# Patient Record
Sex: MALE | Race: ASIAN | Employment: STUDENT | URBAN - METROPOLITAN AREA
[De-identification: names, ages, dates, MRNs, and addresses within clinical notes are randomized per-mention and may not be internally consistent; named-entity substitution may affect disease eponyms.]

---
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
## 2020-10-15 ENCOUNTER — TELEPHONE (OUTPATIENT)
Dept: SCHEDULING | Age: 20
End: 2020-10-15

## 2020-11-06 ENCOUNTER — APPOINTMENT (OUTPATIENT)
Dept: FAMILY MEDICINE | Age: 20
End: 2020-11-06

## 2022-11-18 NOTE — ED PROVIDER NOTES
Patient Seen in: [facility] Emergency Department      History   Patient presents with:  Eval-P (psychiatric)    Stated Complaint: homicidal threats, eval p    HPI    Patient is a 51-year-old male who presents emergency room with a history of reporte Physical Exam  GENERAL: Well-developed, well-nourished male sitting up breathing easily in no apparent distress. Patient is nontoxic in appearance. HEENT: Head is normocephalic, atraumatic. Pupils are 4 mm equally round and reactive to light.  Gustavo Cortes of the Urine Drug Screen should be used only for medical purposes. CBC WITH DIFFERENTIAL WITH PLATELET    Narrative: The following orders were created for panel order CBC WITH DIFFERENTIAL WITH PLATELET.   Procedure                               Abnor Suturegard Body: The suture ends were repeatedly re-tightened and re-clamped to achieve the desired tissue expansion.

## 2024-11-13 ENCOUNTER — ANESTHESIA EVENT (OUTPATIENT)
Dept: SURGERY | Facility: HOSPITAL | Age: 24
End: 2024-11-13

## 2024-11-13 ENCOUNTER — ANESTHESIA (OUTPATIENT)
Dept: SURGERY | Facility: HOSPITAL | Age: 24
End: 2024-11-13

## 2024-11-13 ENCOUNTER — HOSPITAL ENCOUNTER (OUTPATIENT)
Facility: HOSPITAL | Age: 24
Setting detail: OBSERVATION
Discharge: HOME | End: 2024-11-13
Attending: EMERGENCY MEDICINE | Admitting: SURGERY

## 2024-11-13 ENCOUNTER — APPOINTMENT (OUTPATIENT)
Dept: CT IMAGING | Age: 24
End: 2024-11-13
Attending: EMERGENCY MEDICINE

## 2024-11-13 VITALS
HEART RATE: 86 BPM | HEIGHT: 70.08 IN | RESPIRATION RATE: 16 BRPM | DIASTOLIC BLOOD PRESSURE: 82 MMHG | SYSTOLIC BLOOD PRESSURE: 116 MMHG | OXYGEN SATURATION: 99 % | BODY MASS INDEX: 22.47 KG/M2 | WEIGHT: 156.94 LBS | TEMPERATURE: 100 F

## 2024-11-13 DIAGNOSIS — G89.18 POST-OP PAIN: ICD-10-CM

## 2024-11-13 DIAGNOSIS — D72.829 LEUKOCYTOSIS, UNSPECIFIED TYPE: ICD-10-CM

## 2024-11-13 DIAGNOSIS — K35.80 ACUTE APPENDICITIS, UNSPECIFIED ACUTE APPENDICITIS TYPE: Primary | ICD-10-CM

## 2024-11-13 DIAGNOSIS — K35.30 ACUTE APPENDICITIS WITH LOCALIZED PERITONITIS: ICD-10-CM

## 2024-11-13 LAB
ALBUMIN SERPL-MCNC: 4.5 G/DL (ref 3.4–5)
ALBUMIN/GLOB SERPL: 1.3 {RATIO} (ref 1–2)
ALP LIVER SERPL-CCNC: 45 U/L
ALT SERPL-CCNC: 45 U/L
ANION GAP SERPL CALC-SCNC: 7 MMOL/L (ref 0–18)
AST SERPL-CCNC: 12 U/L (ref 15–37)
BASOPHILS # BLD AUTO: 0.05 X10(3) UL (ref 0–0.2)
BASOPHILS NFR BLD AUTO: 0.2 %
BILIRUB SERPL-MCNC: 0.6 MG/DL (ref 0.1–2)
BILIRUB UR QL STRIP.AUTO: NEGATIVE
BUN BLD-MCNC: 17 MG/DL (ref 9–23)
CALCIUM BLD-MCNC: 9.6 MG/DL (ref 8.5–10.1)
CHLORIDE SERPL-SCNC: 107 MMOL/L (ref 98–112)
CLARITY UR REFRACT.AUTO: CLEAR
CO2 SERPL-SCNC: 24 MMOL/L (ref 21–32)
COLOR UR AUTO: YELLOW
CREAT BLD-MCNC: 0.92 MG/DL
EGFRCR SERPLBLD CKD-EPI 2021: 119 ML/MIN/1.73M2 (ref 60–?)
EOSINOPHIL # BLD AUTO: 0.06 X10(3) UL (ref 0–0.7)
EOSINOPHIL NFR BLD AUTO: 0.3 %
ERYTHROCYTE [DISTWIDTH] IN BLOOD BY AUTOMATED COUNT: 12.8 %
GLOBULIN PLAS-MCNC: 3.5 G/DL (ref 2.8–4.4)
GLUCOSE BLD-MCNC: 119 MG/DL (ref 70–99)
GLUCOSE UR STRIP.AUTO-MCNC: NEGATIVE MG/DL
HCT VFR BLD AUTO: 39.2 %
HGB BLD-MCNC: 13.7 G/DL
IMM GRANULOCYTES # BLD AUTO: 0.1 X10(3) UL (ref 0–1)
IMM GRANULOCYTES NFR BLD: 0.5 %
KETONES UR STRIP.AUTO-MCNC: NEGATIVE MG/DL
LEUKOCYTE ESTERASE UR QL STRIP.AUTO: NEGATIVE
LIPASE SERPL-CCNC: 29 U/L (ref 12–53)
LYMPHOCYTES # BLD AUTO: 2.12 X10(3) UL (ref 1–4)
LYMPHOCYTES NFR BLD AUTO: 9.8 %
MCH RBC QN AUTO: 30.9 PG (ref 26–34)
MCHC RBC AUTO-ENTMCNC: 34.9 G/DL (ref 31–37)
MCV RBC AUTO: 88.5 FL
MONOCYTES # BLD AUTO: 0.99 X10(3) UL (ref 0.1–1)
MONOCYTES NFR BLD AUTO: 4.6 %
NEUTROPHILS # BLD AUTO: 18.29 X10 (3) UL (ref 1.5–7.7)
NEUTROPHILS # BLD AUTO: 18.29 X10(3) UL (ref 1.5–7.7)
NEUTROPHILS NFR BLD AUTO: 84.6 %
NITRITE UR QL STRIP.AUTO: NEGATIVE
OSMOLALITY SERPL CALC.SUM OF ELEC: 289 MOSM/KG (ref 275–295)
PH UR STRIP.AUTO: >=9 [PH] (ref 5–8)
PLATELET # BLD AUTO: 322 10(3)UL (ref 150–450)
POTASSIUM SERPL-SCNC: 3.6 MMOL/L (ref 3.5–5.1)
PROT SERPL-MCNC: 8 G/DL (ref 6.4–8.2)
RBC # BLD AUTO: 4.43 X10(6)UL
RBC UR QL AUTO: NEGATIVE
SODIUM SERPL-SCNC: 138 MMOL/L (ref 136–145)
SP GR UR STRIP.AUTO: 1.01 (ref 1–1.03)
UROBILINOGEN UR STRIP.AUTO-MCNC: 0.2 MG/DL
WBC # BLD AUTO: 21.6 X10(3) UL (ref 4–11)

## 2024-11-13 PROCEDURE — 0DTJ4ZZ RESECTION OF APPENDIX, PERCUTANEOUS ENDOSCOPIC APPROACH: ICD-10-PCS | Performed by: SURGERY

## 2024-11-13 PROCEDURE — 83690 ASSAY OF LIPASE: CPT | Performed by: EMERGENCY MEDICINE

## 2024-11-13 PROCEDURE — 80053 COMPREHEN METABOLIC PANEL: CPT | Performed by: EMERGENCY MEDICINE

## 2024-11-13 PROCEDURE — 96375 TX/PRO/DX INJ NEW DRUG ADDON: CPT

## 2024-11-13 PROCEDURE — 96374 THER/PROPH/DIAG INJ IV PUSH: CPT

## 2024-11-13 PROCEDURE — 85025 COMPLETE CBC W/AUTO DIFF WBC: CPT | Performed by: EMERGENCY MEDICINE

## 2024-11-13 PROCEDURE — 81003 URINALYSIS AUTO W/O SCOPE: CPT | Performed by: EMERGENCY MEDICINE

## 2024-11-13 PROCEDURE — 99285 EMERGENCY DEPT VISIT HI MDM: CPT

## 2024-11-13 PROCEDURE — S0028 INJECTION, FAMOTIDINE, 20 MG: HCPCS | Performed by: EMERGENCY MEDICINE

## 2024-11-13 PROCEDURE — 74177 CT ABD & PELVIS W/CONTRAST: CPT | Performed by: EMERGENCY MEDICINE

## 2024-11-13 PROCEDURE — 96361 HYDRATE IV INFUSION ADD-ON: CPT

## 2024-11-13 RX ORDER — DIPHENHYDRAMINE HYDROCHLORIDE 50 MG/ML
12.5 INJECTION INTRAMUSCULAR; INTRAVENOUS AS NEEDED
Status: DISCONTINUED | OUTPATIENT
Start: 2024-11-13 | End: 2024-11-13

## 2024-11-13 RX ORDER — ONDANSETRON 2 MG/ML
4 INJECTION INTRAMUSCULAR; INTRAVENOUS EVERY 4 HOURS PRN
Status: ACTIVE | OUTPATIENT
Start: 2024-11-13 | End: 2024-11-13

## 2024-11-13 RX ORDER — SODIUM CHLORIDE, SODIUM LACTATE, POTASSIUM CHLORIDE, CALCIUM CHLORIDE 600; 310; 30; 20 MG/100ML; MG/100ML; MG/100ML; MG/100ML
INJECTION, SOLUTION INTRAVENOUS CONTINUOUS
Status: DISCONTINUED | OUTPATIENT
Start: 2024-11-13 | End: 2024-11-13

## 2024-11-13 RX ORDER — ONDANSETRON 2 MG/ML
4 INJECTION INTRAMUSCULAR; INTRAVENOUS ONCE
Status: COMPLETED | OUTPATIENT
Start: 2024-11-13 | End: 2024-11-13

## 2024-11-13 RX ORDER — HYDROCODONE BITARTRATE AND ACETAMINOPHEN 5; 325 MG/1; MG/1
2 TABLET ORAL ONCE AS NEEDED
Status: DISCONTINUED | OUTPATIENT
Start: 2024-11-13 | End: 2024-11-13

## 2024-11-13 RX ORDER — KETOROLAC TROMETHAMINE 30 MG/ML
INJECTION, SOLUTION INTRAMUSCULAR; INTRAVENOUS AS NEEDED
Status: DISCONTINUED | OUTPATIENT
Start: 2024-11-13 | End: 2024-11-13 | Stop reason: SURG

## 2024-11-13 RX ORDER — PROCHLORPERAZINE EDISYLATE 5 MG/ML
5 INJECTION INTRAMUSCULAR; INTRAVENOUS EVERY 8 HOURS PRN
Status: DISCONTINUED | OUTPATIENT
Start: 2024-11-13 | End: 2024-11-13

## 2024-11-13 RX ORDER — HYDROMORPHONE HYDROCHLORIDE 1 MG/ML
0.6 INJECTION, SOLUTION INTRAMUSCULAR; INTRAVENOUS; SUBCUTANEOUS EVERY 5 MIN PRN
Status: DISCONTINUED | OUTPATIENT
Start: 2024-11-13 | End: 2024-11-13

## 2024-11-13 RX ORDER — HYDROMORPHONE HYDROCHLORIDE 1 MG/ML
0.5 INJECTION, SOLUTION INTRAMUSCULAR; INTRAVENOUS; SUBCUTANEOUS EVERY 30 MIN PRN
Status: ACTIVE | OUTPATIENT
Start: 2024-11-13 | End: 2024-11-13

## 2024-11-13 RX ORDER — ACETAMINOPHEN 500 MG
1000 TABLET ORAL ONCE AS NEEDED
Status: DISCONTINUED | OUTPATIENT
Start: 2024-11-13 | End: 2024-11-13

## 2024-11-13 RX ORDER — MORPHINE SULFATE 4 MG/ML
4 INJECTION, SOLUTION INTRAMUSCULAR; INTRAVENOUS ONCE
Status: COMPLETED | OUTPATIENT
Start: 2024-11-13 | End: 2024-11-13

## 2024-11-13 RX ORDER — ROCURONIUM BROMIDE 10 MG/ML
INJECTION, SOLUTION INTRAVENOUS AS NEEDED
Status: DISCONTINUED | OUTPATIENT
Start: 2024-11-13 | End: 2024-11-13 | Stop reason: SURG

## 2024-11-13 RX ORDER — METRONIDAZOLE 500 MG/100ML
INJECTION, SOLUTION INTRAVENOUS AS NEEDED
Status: DISCONTINUED | OUTPATIENT
Start: 2024-11-13 | End: 2024-11-13 | Stop reason: SURG

## 2024-11-13 RX ORDER — ONDANSETRON 2 MG/ML
4 INJECTION INTRAMUSCULAR; INTRAVENOUS EVERY 6 HOURS PRN
Status: DISCONTINUED | OUTPATIENT
Start: 2024-11-13 | End: 2024-11-13

## 2024-11-13 RX ORDER — HYDROCODONE BITARTRATE AND ACETAMINOPHEN 5; 325 MG/1; MG/1
1 TABLET ORAL EVERY 6 HOURS PRN
Qty: 15 TABLET | Refills: 0 | Status: SHIPPED | OUTPATIENT
Start: 2024-11-13

## 2024-11-13 RX ORDER — ONDANSETRON 2 MG/ML
INJECTION INTRAMUSCULAR; INTRAVENOUS AS NEEDED
Status: DISCONTINUED | OUTPATIENT
Start: 2024-11-13 | End: 2024-11-13 | Stop reason: SURG

## 2024-11-13 RX ORDER — HYDROMORPHONE HYDROCHLORIDE 1 MG/ML
0.4 INJECTION, SOLUTION INTRAMUSCULAR; INTRAVENOUS; SUBCUTANEOUS EVERY 5 MIN PRN
Status: DISCONTINUED | OUTPATIENT
Start: 2024-11-13 | End: 2024-11-13

## 2024-11-13 RX ORDER — MIDAZOLAM HYDROCHLORIDE 1 MG/ML
1 INJECTION INTRAMUSCULAR; INTRAVENOUS EVERY 5 MIN PRN
Status: DISCONTINUED | OUTPATIENT
Start: 2024-11-13 | End: 2024-11-13

## 2024-11-13 RX ORDER — MEPERIDINE HYDROCHLORIDE 25 MG/ML
12.5 INJECTION INTRAMUSCULAR; INTRAVENOUS; SUBCUTANEOUS AS NEEDED
Status: DISCONTINUED | OUTPATIENT
Start: 2024-11-13 | End: 2024-11-13

## 2024-11-13 RX ORDER — HYDROCODONE BITARTRATE AND ACETAMINOPHEN 5; 325 MG/1; MG/1
1 TABLET ORAL ONCE AS NEEDED
Status: DISCONTINUED | OUTPATIENT
Start: 2024-11-13 | End: 2024-11-13

## 2024-11-13 RX ORDER — FAMOTIDINE 10 MG/ML
20 INJECTION, SOLUTION INTRAVENOUS ONCE
Status: COMPLETED | OUTPATIENT
Start: 2024-11-13 | End: 2024-11-13

## 2024-11-13 RX ORDER — SODIUM CHLORIDE 9 MG/ML
INJECTION, SOLUTION INTRAVENOUS CONTINUOUS PRN
Status: DISCONTINUED | OUTPATIENT
Start: 2024-11-13 | End: 2024-11-13 | Stop reason: SURG

## 2024-11-13 RX ORDER — NALOXONE HYDROCHLORIDE 0.4 MG/ML
0.08 INJECTION, SOLUTION INTRAMUSCULAR; INTRAVENOUS; SUBCUTANEOUS AS NEEDED
Status: DISCONTINUED | OUTPATIENT
Start: 2024-11-13 | End: 2024-11-13

## 2024-11-13 RX ORDER — DICYCLOMINE HCL 20 MG
20 TABLET ORAL 4 TIMES DAILY PRN
Qty: 30 TABLET | Refills: 0 | Status: SHIPPED | OUTPATIENT
Start: 2024-11-13 | End: 2024-12-13

## 2024-11-13 RX ORDER — ONDANSETRON 4 MG/1
4 TABLET, ORALLY DISINTEGRATING ORAL EVERY 4 HOURS PRN
Qty: 10 TABLET | Refills: 0 | Status: SHIPPED | OUTPATIENT
Start: 2024-11-13 | End: 2024-11-20

## 2024-11-13 RX ORDER — SODIUM CHLORIDE 9 MG/ML
INJECTION, SOLUTION INTRAVENOUS CONTINUOUS
Status: ACTIVE | OUTPATIENT
Start: 2024-11-13 | End: 2024-11-13

## 2024-11-13 RX ORDER — DEXAMETHASONE SODIUM PHOSPHATE 4 MG/ML
VIAL (ML) INJECTION AS NEEDED
Status: DISCONTINUED | OUTPATIENT
Start: 2024-11-13 | End: 2024-11-13 | Stop reason: SURG

## 2024-11-13 RX ORDER — BUPIVACAINE HYDROCHLORIDE AND EPINEPHRINE 5; 5 MG/ML; UG/ML
INJECTION, SOLUTION EPIDURAL; INTRACAUDAL; PERINEURAL AS NEEDED
Status: DISCONTINUED | OUTPATIENT
Start: 2024-11-13 | End: 2024-11-13 | Stop reason: HOSPADM

## 2024-11-13 RX ORDER — HYDROMORPHONE HYDROCHLORIDE 1 MG/ML
0.2 INJECTION, SOLUTION INTRAMUSCULAR; INTRAVENOUS; SUBCUTANEOUS EVERY 5 MIN PRN
Status: DISCONTINUED | OUTPATIENT
Start: 2024-11-13 | End: 2024-11-13

## 2024-11-13 RX ORDER — LIDOCAINE HYDROCHLORIDE 10 MG/ML
INJECTION, SOLUTION EPIDURAL; INFILTRATION; INTRACAUDAL; PERINEURAL AS NEEDED
Status: DISCONTINUED | OUTPATIENT
Start: 2024-11-13 | End: 2024-11-13 | Stop reason: SURG

## 2024-11-13 RX ADMIN — METRONIDAZOLE 500 MG: 500 INJECTION, SOLUTION INTRAVENOUS at 14:09:00

## 2024-11-13 RX ADMIN — SODIUM CHLORIDE: 9 INJECTION, SOLUTION INTRAVENOUS at 14:54:00

## 2024-11-13 RX ADMIN — SODIUM CHLORIDE: 9 INJECTION, SOLUTION INTRAVENOUS at 13:59:00

## 2024-11-13 RX ADMIN — LIDOCAINE HYDROCHLORIDE 50 MG: 10 INJECTION, SOLUTION EPIDURAL; INFILTRATION; INTRACAUDAL; PERINEURAL at 14:03:00

## 2024-11-13 RX ADMIN — DEXAMETHASONE SODIUM PHOSPHATE 8 MG: 4 MG/ML VIAL (ML) INJECTION at 14:16:00

## 2024-11-13 RX ADMIN — ONDANSETRON 4 MG: 2 INJECTION INTRAMUSCULAR; INTRAVENOUS at 14:16:00

## 2024-11-13 RX ADMIN — ROCURONIUM BROMIDE 40 MG: 10 INJECTION, SOLUTION INTRAVENOUS at 14:05:00

## 2024-11-13 RX ADMIN — KETOROLAC TROMETHAMINE 30 MG: 30 INJECTION, SOLUTION INTRAMUSCULAR; INTRAVENOUS at 14:33:00

## 2024-11-13 NOTE — OPERATIVE REPORT
[facility]  Operative Note    Everton Lewis Location: OR   [facility] [id number] MRN [id number]   Admission Date 11/13/2024 Operation Date 11/13/2024   Attending Physician Zainab Pelletier MD Operating Physician Zainab Pelletier MD     Date of procedure:   November 13, 2024    Pre-Operative Diagnosis: Acute appendicitis with localized peritonitis [[id number]]    Indication for Surgery: Acute appendicitis    Post-Operative Diagnosis: Same as above     Procedure Performed: Laparoscopic appendectomy    Surgeons and Role:     * Zainab Pelletier MD - Primary    Anesthesia: General    History of Present Illness:         24-year-old male who presents to the emergency department with a history and physical examination consistent with acute appendicitis.  Diagnosis is supported by CT scan findings which revealed a thickened appendix with periappendiceal inflammatory changes consistent with acute appendicitis.  No evidence of perforation or abscess formation.  Additionally the patient does have a leukocytosis of 21.6.  Treatment options were reviewed in detail with the patient and at this time the recommendation is to proceed with laparoscopic appendectomy for acute appendicitis    Discussed with patient:  The potential treatment options, risks and benefits of the procedure were discussed in detail with the patient including but not limited to bleeding, infection, seroma/ hematoma formation, postoperative wound complications including development of a hernia, possible trocar injury and injury to other internal organs, possible removal of a normal appendix, possible need for a drain, possible conversion to open appendectomy, postoperative pneumonia, postoperative thromboembolic event including PE and DVT.  These and other potential intraoperative and perioperative risks were reviewed.  The patient states understanding and does not have any further questions at this time and does wish to proceed with surgery.    Summary of case: The  patient was taken to the operating room and was placed on the OR table in the supine position. After the induction of general anesthesia perioperative antibiotics were given. The patient was prepped and draped in usual sterile fashion. Using local anesthesia a hermes-umbilical incision was made and the anterior abdominal fascia was elevated with a Malathi forcep.  The Veress needle was introduced into the abdomen and the abdomen was insufflated to 15 mmHg.  The Veress needle was exchanged for a 11 mm port. Under direct vision 2 accessory ports were placed without incidence. The patient was turned into a Trendelenburg position with the right side up.  The cecum was identified and this was swept medially to visualize the appendix.  The appendix was noted to be dilated and erythematous with fibrinous exudate consistent with acute suppurative appendicitis.  There is no evidence of transmural necrosis, abscess formation or perforation.   The meso-appendix was grasped and elevated towards the anterior abdominal wall a small mesenteric defect was made and the base of the appendix was stapled across and divided.  In a similar fashion the mesoappendix was also stapled across and divided. The appendix was placed into an Endopouch and was withdrawn through the suprapubic port site under direct vision. The trocar was then replaced.  The staple lines across the mesoappendix and base of the appendix was identified and found to be intact. There was no evidence of bleeding. The right lower quadrant and pelvis was copiously irrigated with antibiotic irrigation fluid until the irrigant was clear. The accessory ports were removed under direct vision with no evidence of bleeding from the anterior abdominal wall. The abdomen was deflated. The suprapubic and umbilical ports were closed with 0 Vicryl in a figure-of-eight fascial stitch. All skin incisions were closed with 4-0 Vicryl in a subcuticular manner Steri-Strips and sterile dressing  were placed.  All sponge instrument and needle counts were correct at the conclusion of the procedure. The patient was extubated in the operating room and was taken to the recovery room in stable condition.  Called patient's father postoperatively at [phone]-no answer.  Voicemail was not available to leave message.  At the patient's request, I did call his girlfriend Olinda at [phone].      EBL: Minimal    Pathologic Specimen: Appendix    Drains:  None      Zainab Pelletier MD      Please note that this report has been produced using speech recognition software and may contain errors related to that system including but not limited to errors in grammar, punctuation and spelling as well as words and phrases that possibly may have been recognized inappropriately.  If there are any questions or concerns please contact the dictating provider for clarification.  The 21st Century Cures Act makes medical notes like these available to patients in the interest of transparency. Please be advised this is a medical document. Medical documents are intended to carry relevant information, facts as evident, and the clinical opinion of the practitioner. The medical note is intended as peer to peer communication and may appear blunt or direct. It is written in medical language and may contain abbreviations or verbiage that are unfamiliar.  If there are any questions or concerns please contact the dictating provider for clarification.

## 2024-11-13 NOTE — ANESTHESIA PREPROCEDURE EVALUATION
PRE-OP EVALUATION    Patient Name: Everton Lewis    Admit Diagnosis: Acute appendicitis, unspecified acute appendicitis type [K35.80]  Leukocytosis, unspecified type [D72.829]    Pre-op Diagnosis: Appendicitis [K37]    LAPAROSCOPIC APPENDECTOMY    Anesthesia Procedure: LAPAROSCOPIC APPENDECTOMY (Abdomen)    Surgeons and Role:     * Zainab Pelletier MD - Primary    Pre-op vitals reviewed.  Temp: 99.1 °F (37.3 °C)  Pulse: 76  Resp: 16  BP: 118/89  SpO2: 100 %  Body mass index is 22.47 kg/m².    Current medications reviewed.  Hospital Medications:  • [COMPLETED] ondansetron (Zofran) 4 MG/2ML injection 4 mg  4 mg Intravenous Once   • [COMPLETED] famotidine (Pepcid) 20 mg/2mL injection 20 mg  20 mg Intravenous Once   • [COMPLETED] sodium chloride 0.9 % IV bolus 1,000 mL  1,000 mL Intravenous Once   • [COMPLETED] iopamidol 76% (ISOVUE-370) injection for power injector  80 mL Intravenous ONCE PRN   • [COMPLETED] morphINE PF 4 MG/ML injection 4 mg  4 mg Intravenous Once   • [] sodium chloride 0.9% infusion   Intravenous Continuous   • [] HYDROmorphone (Dilaudid) 1 MG/ML injection 0.5 mg  0.5 mg Intravenous Q30 Min PRN   • [] ondansetron (Zofran) 4 MG/2ML injection 4 mg  4 mg Intravenous Q4H PRN       Outpatient Medications:   Prescriptions Prior to Admission[1]    Allergies: Shellfish-derived products and Seafood      Anesthesia Evaluation    Patient summary reviewed.    Anesthetic Complications           GI/Hepatic/Renal    Negative GI/hepatic/renal ROS.                             Cardiovascular    Negative cardiovascular ROS.                                                   Endo/Other    Negative endo/other ROS.                              Pulmonary    Negative pulmonary ROS.                       Neuro/Psych    Negative neuro/psych ROS.                              History reviewed. No pertinent surgical history.  Social History     Socioeconomic History   • Marital status: Single   Tobacco Use   •  Smoking status: Some Days     Current packs/day: 0.50     Types: Cigarettes     Passive exposure: Never   • Smokeless tobacco: Never   • Tobacco comments:     Juul    Vaping Use   • Vaping status: Every Day   Substance and Sexual Activity   • Alcohol use: Never     Alcohol/week: 0.0 standard drinks of alcohol   • Drug use: Never     History   Drug Use Unknown     Available pre-op labs reviewed.  Lab Results   Component Value Date    WBC 21.6 (H) 11/13/2024    RBC 4.43 11/13/2024    HGB 13.7 11/13/2024    HCT 39.2 11/13/2024    MCV 88.5 11/13/2024    MCH 30.9 11/13/2024    MCHC 34.9 11/13/2024    RDW 12.8 11/13/2024    .0 11/13/2024     Lab Results   Component Value Date     11/13/2024    K 3.6 11/13/2024     11/13/2024    CO2 24.0 11/13/2024    BUN 17 11/13/2024    CREATSERUM 0.92 11/13/2024     (H) 11/13/2024    CA 9.6 11/13/2024            Airway      Mallampati: II  Mouth opening: 3 FB  TM distance: 4 - 6 cm  Neck ROM: full Cardiovascular    Cardiovascular exam normal.         Dental    Dentition appears grossly intact         Pulmonary    Pulmonary exam normal.                 Other findings        ASA: 1 and emergent  Plan: general  NPO status verified and patient meets guidelines. (full stomach assumed)        Comment: Patient declined . Risks discussed including sore throat, hoarse voice, dental trauma, nausea/vomiting  Plan/risks discussed with: significant other            Present on Admission:  **None**             [1]   No medications prior to admission.

## 2024-11-13 NOTE — ANESTHESIA PROCEDURE NOTES
Airway  Date/Time: 11/13/2024 2:06 PM  Urgency: elective    Airway not difficult    General Information and Staff    Patient location during procedure: OR  Anesthesiologist: Artie Agrawal MD  Performed: anesthesiologist   Performed by: Artie Agrawal MD  Authorized by: Artie Agrawal MD      Indications and Patient Condition  Indications for airway management: anesthesia  Spontaneous ventilation: present  Sedation level: minimal  Preoxygenated: yes  Patient position: sniffing  Mask difficulty assessment: 1 - vent by mask    Final Airway Details  Final airway type: endotracheal airway      Successful airway: ETT  Cuffed: yes   Successful intubation technique: Video laryngoscopy  Facilitating devices/methods: cricoid pressure and rapid sequence intubation  Endotracheal tube insertion site: oral  Blade: GlideScope  Blade size: #3  ETT size (mm): 7.5    Cormack-Lehane Classification: grade I - full view of glottis  Placement verified by: capnometry   Measured from: lips  ETT to lips (cm): 23  Number of attempts at approach: 1

## 2024-11-13 NOTE — H&P
[facility]  Report of  Surgical Consultation with History and Physical Exam    Everton Lewis Patient Status:  Observation    2000 MRN [id number]   Location [facility] 3SW-A Attending Zainab Pelletier MD   Hosp Day # 0 PCP Unknown Pcp     Reason for Surgical Consultation:  I am seeing this patient at the request of Dr. Ruano for acute appendicitis.  English is not the patient's first language.  He declined a language lines  at the time of today's exam.    Chief complaint:   Abdominal pain    History of Present Illness:  Everton Lewis is a a(n) 24 year old male who presented to the [place] ER with approximately 12 hours of worsening abdominal pain.    The patient states he began having mild abdominal pain yesterday afternoon.  He states he felt full and distended with a generalized dull abdominal ache.  He states that abdominal pain persisted into the evening and into the night.    He states around 10:30 PM, the pain became more localized to the periumbilical region.  The pain was more intense.  He states it was associated with nausea and 6 or 7 episodes of vomiting.  He denies associated fevers or chills.  He denies diarrhea or constipation.    Upon presentation to the emergency department, CT scan of the abdomen and pelvis revealed a thickened appendix with periappendiceal inflammatory changes.  No abscess or free air.  All other significant findings may be seen in the radiologist report.    The patient's vital signs are stable.  Leukocytosis (21.6) with left shift.  Hemoglobin 13.7.  Platelets 322,000.  Electrolytes are within normal limits.  CMP is unremarkable.  Lipase is within normal limits.    The patient has no significant past medical history.  He is not take any blood thinning medications.  The patient has no significant past surgical history.            History:  Past Medical History:    Syncope    Multiple episodes as a young child, full work-up normal/negative     History  reviewed. No pertinent surgical history.  Family History   Problem Relation Age of Onset    Diabetes Paternal Grandfather       reports that he has been smoking cigarettes. He has never been exposed to tobacco smoke. He has never used smokeless tobacco. He reports that he does not drink alcohol and does not use drugs.    Allergies:  Allergies[1]    Medications:  No current facility-administered medications for this encounter.    Review of Systems:  Pertinent items are noted in HPI.  Allergic/Immuno:  Review of patient's allergies performed.  Cardiovascular:  Negative for cool extremity and irregular heartbeat/palpitations  Constitutional:  Negative for decreased activity, fever, irritability and lethargy  Endocrine:  Negative for abnormal sleep patterns, increased activity, polydipsia and polyphagia  ENMT:  Negative for ear drainage, hearing loss and nasal drainage  Eyes:  Negative for eye discharge and vision loss  Gastrointestinal: Positive for abdominal pain, nausea and vomiting  Genitourinary:  Negative for dysuria and hematuria  Hema/Lymph:  Negative for easy bleeding and easy bruising  Integumentary:  Negative for pruritus and rash  Musculoskeletal:  Negative for bone/joint symptoms  Neurological:  Negative for gait disturbance  Psychiatric:  Negative for inappropriate interaction and psychiatric symptoms  Respiratory:  Negative for cough, dyspnea and wheezing      Physical Exam:  Blood pressure 118/89, pulse 76, temperature 99.1 °F (37.3 °C), temperature source Oral, resp. rate 16, height 70.08\", weight 156 lb 15.5 oz (71.2 kg), SpO2 100%.    General: Alert, orientated x3.  Cooperative.  No apparent distress.    HEENT: Exam is unremarkable.  Without scleral icterus.  Mucous membranes are moist. EOM are WNL.    Neck: No tenderness to palpitation.  Full range of motion to flexion and extension, lateral rotation and lateral flexion of cervical spine.  No JVD. Supple.     Lungs: No secondary use of accessory  respiratory musculature.    Abdomen: The patient received morphine slightly before my abdominal exam, and states his abdominal pain did resolve almost entirely with IV pain medications.  On exam, the patient's abdomen was soft, nondistended without tympany to percussion.  Mild discomfort to deep palpation in the periumbilical region.  No rebound or guarding.  No peritoneal signs.    Extremities:  No lower extremity edema noted.  Without clubbing or cyanosis.      Skin: Normal texture and turgor.      Laboratory Data and Relevant X-rays:  Recent Labs   Lab 11/13/24  0452   RBC 4.43   HGB 13.7   HCT 39.2   MCV 88.5   MCH 30.9   MCHC 34.9   RDW 12.8   NEPRELIM 18.29*   WBC 21.6*   .0       Recent Labs   Lab 11/13/24  0452   *   BUN 17   CREATSERUM 0.92   CA 9.6   ALB 4.5      K 3.6      CO2 24.0   ALKPHO 45   AST 12*   ALT 45   BILT 0.6   TP 8.0         No results for input(s): \"PTP\", \"INR\", \"PTT\" in the last 168 hours.    Imaging    Narrative  PROCEDURE:  CT ABDOMEN+PELVIS (CONTRAST ONLY) (CPT=74177)     COMPARISON:  None.     INDICATIONS:  abdominal pain since this afternoon, vomiting     TECHNIQUE:  CT scanning was performed from the dome of the diaphragm to the pubic symphysis with non-ionic intravenous contrast material. Post contrast coronal MPR imaging was performed.  Dose reduction techniques were used. Dose information is  transmitted to the ACR (American College of Radiology) NRDR (National Radiology Data Registry) which includes the Dose Index Registry.     PATIENT STATED HISTORY:(As transcribed by Technologist)  Pt in er for c/o upper epigastric abd pain with vomiting      CONTRAST USED:  80cc of Isovue 370     FINDINGS:    LIVER:  No enlargement, atrophy, abnormal density, or significant focal lesion.    BILIARY:  No visible dilatation or calcification.    PANCREAS:  No lesion, fluid collection, ductal dilatation, or atrophy.    SPLEEN:  No enlargement or focal lesion.    KIDNEYS:   No mass, obstruction, or calcification.    ADRENALS:  No mass or enlargement.    AORTA/VASCULAR:  No aneurysm or dissection.    RETROPERITONEUM:  No mass or adenopathy.    BOWEL/MESENTERY:  Thickened appendix with periappendiceal inflammatory changes.  No abscess or free air.  ABDOMINAL WALL:  No mass or hernia.    URINARY BLADDER:  No visible focal wall thickening, lesion, or calculus.    PELVIC NODES:  No adenopathy.    PELVIC ORGANS:  No visible mass.  Pelvic organs appropriate for patient age.    BONES:  No bony lesion or fracture.    LUNG BASES:  No visible pulmonary or pleural disease.                       Impression  CONCLUSION:  Acute appendicitis.        LOCATION:  [facility]        Dictated by (CST): Saman Alexis MD on 11/13/2024 at 6:51 AM      Finalized by (CST): Saman Alexis MD on 11/13/2024 at 6:53 AM      Erica Gill PA-C  11/13/2024  1:11 PM    Is this a shared or split note between Advanced Practice Provider and Physician? Yes      Impression:  Patient Active Problem List   Diagnosis    Depressive disorder    Acute alteration in mental status    Ibuprofen overdose, intentional self-harm, initial encounter (HCC)    Antihistamine poisoning, intentional self-harm, initial encounter (HCC)    Suicide attempt by drug ingestion (HCC)    Major depressive disorder, single episode, severe (HCC)    Acute delirium    Depression    Acute appendicitis, unspecified acute appendicitis type    Leukocytosis, unspecified type     24-year-old male who presents to the emergency department with a history and physical examination consistent with acute appendicitis.  Diagnosis is supported by CT scan findings which revealed a thickened appendix with periappendiceal inflammatory changes consistent with acute appendicitis.  No evidence of perforation or abscess formation.  Additionally the patient does have a leukocytosis of 21.6.  Treatment options were reviewed in detail with the patient and at this time the  recommendation is to proceed with laparoscopic appendectomy for acute appendicitis  The risks, benefits, complications, possible outcomes and alternatives of the procedure were explained to the patient in detail.  Potential complications of this procedure and as with any surgical procedure and anesthesia were reviewed including but not limited to bleeding, infection, thromboembolic event, pneumonia were discussed.  Expected postoperative pain, recuperation and postoperative course and possible complications were also reviewed.  All questions from the patient were answered in detail.  The patient did verbalize understanding and does not have any further questions at this time.  The patient does wish to proceed with the proposed procedure.    MONA Pelletier MD, FACS    Please note that this report has been produced using speech recognition software and may contain errors related to that system including but not limited to errors in grammar, punctuation and spelling as well as words and phrases that possibly may have been recognized inappropriately.  If there are any questions or concerns please contact the dictating provider for clarification.  The 21st Century Cures Act makes medical notes like these available to patients in the interest of trans parency. Please be advised this is a medical document. Medical documents are intended to carry relevant information, facts as evident, and the clinical opinion of the practitioner. The medical note is intended as peer to peer communication and may appear blunt or direct. It is written in medical language and may contain abbreviations or verbiage that are unfamiliar.  If there are any questions or concerns please contact the dictating provider for clarification.          MONA Pelletier MD, FACS  Please note that this report has been produced using speech recognition software and may contain errors related to that system including but not limited to errors in grammar, punctuation and  spelling as well as words and phrases that possibly may have been recognized inappropriately.  If there are any questions or concerns please contact the dictating provider for clarification.  The 21st Century Cures Act makes medical notes like these available to patients in the interest of trans parency. Please be advised this is a medical document. Medical documents are intended to carry relevant information, facts as evident, and the clinical opinion of the practitioner. The medical note is intended as peer to peer communication and may appear blunt or direct. It is written in medical language and may contain abbreviations or verbiage that are unfamiliar.  If there are any questions or concerns please contact the dictating provider for clarification.           [1]   Allergies  Allergen Reactions    Shellfish-Derived Products RASH, ITCHING and Tightness in Throat    Seafood RASH

## 2024-11-13 NOTE — ANESTHESIA POSTPROCEDURE EVALUATION
[facility]    Everton Lewis Patient Status:  Observation   Age/Gender 24 year old male MRN [id number]   Location [facility] SURGERY Attending Zainab Pelletier MD   Hosp Day # 0 PCP Unknown Pcp       Anesthesia Post-op Note    LAPAROSCOPIC APPENDECTOMY    Procedure Summary       Date: 11/13/24 Room / Location:  MAIN OR 03 / [facility] MAIN OR    Anesthesia Start: 1359 Anesthesia Stop:     Procedure: LAPAROSCOPIC APPENDECTOMY (Abdomen) Diagnosis:       Acute appendicitis with localized peritonitis      (Acute appendicitis with localized peritonitis [608553])    Surgeons: Zainab Pelletier MD Anesthesiologist: Artie Agrawal MD    Anesthesia Type: general ASA Status: 1 - Emergent            Anesthesia Type: general    Vitals Value Taken Time   /100 11/13/24 1454   Temp 98.6 11/13/24 1454   Pulse 104 11/13/24 1454   Resp 22 11/13/24 1454   SpO2 98 11/13/24 1454       Patient Location: PACU    Anesthesia Type: general    Airway Patency: patent and extubated    Postop Pain Control: adequate    Mental Status: mildly sedated but able to meaningfully participate in the post-anesthesia evaluation    Nausea/Vomiting: none    Cardiopulmonary/Hydration status: stable euvolemic    Complications: no apparent anesthesia related complications    Postop vital signs: stable    Dental Exam: Unchanged from Preop    Patient to be discharged from PACU when criteria met.

## 2024-11-13 NOTE — PLAN OF CARE
Patient admitted under Dr Pelletier for Appendicitis. Vitals stable, pain controlled. Remains NPO.

## 2024-11-13 NOTE — DISCHARGE INSTRUCTIONS
Home Care Instructions  Appendectomy    MEDICATIONS  For post-operative pain control the medications are usually Norco-5 or Tylenol #3.  These are narcotics and are best taken by starting with one tablet and repeating every four to six hours as needed.  If the patient does not feel they need the narcotics, they shouldn’t take them.  If the pain is severe, the patient may take up to two pills every four hours.  If a minor supplement is necessary in addition to the narcotics do not overlap with Tylenol (any product with acetaminophen) as both Norco and Tylenol #3 contain Tylenol.  Please supplement with ibuprofen (Advil or Motrin).  Please ask your surgeon before resuming blood thinners such as aspirin, Plavix or Coumadin.  All other home medications may be resumed as scheduled.  With severe appendicitis, antibiotics will also be prescribed.  With antibiotics, please watch for rash, facial swelling or severe diarrhea.    DIET  The patient may resume a general diet immediately.  This is not a good time to eat excessively.  The patient should eat in moderation and stick with foods the patient feels are easy to digest.  There should be no alcohol consumption in the immediate recover time period or within six hours of taking narcotics.    WOUND CARE  The top dressing may be removed the day after surgery.  This includes the gauze, tape and band-aids if they are present.  Do not remove the steri-strips or butterfly tapes that are white and adherent to the skin.  The steri-strips will eventually peel up at the ends and at this point they may be removed.  This is usually seven to ten days after surgery.  The patient may shower the day after surgery.  There is no need to cover the incisions and all top gauze type dressings should be removed prior to showering.  Soap can get on the wounds but do not scrub over the wounds.  No hair dye or chemicals of any kind should get in the wounds.  Avoid tub baths for two weeks.  If visible  sutures or staples are present they will be removed in the office by the surgeon or nurse.  Most wounds will be closed with dissolving suture underneath the skin.  These sutures will dissolve on their own.    ACTIVITY  Every day the patient should be up, showered and dressed.  Each day the patient should be up and around the house.  The patient should not lie in bed and should not stay in pajamas.  We count on the patient being up, coughing, walking and deep breathing to avoid pneumonia and blood clots in the legs.  Once a day the patient should get out of the house and go shopping, go to the mall, the [employer] store, the [employer] or a restaurant.  The patient may ride in a car but should not drive the car for at least one week.  Patients should be off narcotics for at least 8 hours prior to driving.  The average time off work is 10 to 14 days; most adults will be seeing the surgeon prior to returning to work.  Students will return to school within 1-5 days after discharge from the hospital but will be off gym, sports, and indoors for recess for one month.  Patients may go up and down stairs and lift up to five pounds but no bending, pushing or pulling.  Patients should do nothing called work or exercise until the follow up visit.  Patients should seek further activity limits at the time of their appointment.    APPOINTMENT  Please call our office for an appointment within five to ten days of discharge.  Any fever greater than 100.5, chills, nausea, vomiting, or severe diarrhea please call our office.  If the wound turns red, hot, swollen, becomes increasingly painful, or drains pus call us immediately at [phone].  For life threatening emergencies call 911.  For non-emergent care please call our office after 8:30 a.m. Monday through Friday.  The number listed above is our office number.  Our phone automatically switches to our answering service if we are not there.  Please do not use the emergency room for  non-urgent care.    Thank you for entrusting us with your care.  [facility]--General Surgery

## 2024-11-13 NOTE — ED PROVIDER NOTES
Patient Seen in: [facility] [place]      History     Chief Complaint   Patient presents with    Abdomen/Flank Pain    Nausea/Vomiting/Diarrhea     Stated Complaint: abdominal pain since this afternoon, vomiting    Subjective:   HPI      24-year-old male with no significant past medical history presents reporting nausea, vomiting, generalized abdominal discomfort.  No diarrhea.  Denies fever.  No previous abdominal surgeries.  No known sick contacts.  No urinary symptoms.  Denies flank pain.    Objective:     Past Medical History:    Syncope    Multiple episodes as a young child, full work-up normal/negative              History reviewed. No pertinent surgical history.             Social History     Socioeconomic History    Marital status: Single   Tobacco Use    Smoking status: Some Days     Current packs/day: 0.50     Types: Cigarettes     Passive exposure: Never    Smokeless tobacco: Never    Tobacco comments:     Juul    Vaping Use    Vaping status: Every Day   Substance and Sexual Activity    Alcohol use: Never     Alcohol/week: 0.0 standard drinks of alcohol    Drug use: Never   Social History Narrative    ** Merged History Encounter **                       Physical Exam     ED Triage Vitals [11/13/24 0450]   /79   Pulse 71   Resp 16   Temp 97.4 °F (36.3 °C)   Temp src Oral   SpO2 99 %   O2 Device None (Room air)       Current Vitals:   Vital Signs  BP: 141/80  Pulse: 73  Resp: 19  Temp: 97.4 °F (36.3 °C)  Temp src: Oral    Oxygen Therapy  SpO2: 100 %  O2 Device: None (Room air)        Physical Exam  General:  Vitals as listed.    HEENT: Sclerae anicteric.  Mucous membranes moist   Lungs: good air exchange and clear   Heart: regular rate rhythm and no murmur   Abdomen: Nondistended.  Moderate generalized tenderness on palpation of the abdomen.  Normal bowel sounds.  No abdominal masses.  No peritoneal signs       ED Course     Labs Reviewed   CBC WITH DIFFERENTIAL WITH PLATELET -  Abnormal; Notable for the following components:       Result Value    WBC 21.6 (*)     Neutrophil Absolute Prelim 18.29 (*)     Neutrophil Absolute 18.29 (*)     All other components within normal limits   COMP METABOLIC PANEL (14) - Abnormal; Notable for the following components:    Glucose 119 (*)     AST 12 (*)     All other components within normal limits   URINALYSIS WITH CULTURE REFLEX - Abnormal; Notable for the following components:    pH Urine >=9.0 (*)     Protein Urine Trace (*)     All other components within normal limits   LIPASE - Normal            CT abdomen and pelvis enhanced      IMPRESSION:  Findings consistent with acute appendicitis  10 mm wall enhanced appendix with mild periappendiceal mesenteric inflammation series 2 images 79-85  No evidence of abscess or perforation  Mild ileus pattern pelvic small bowel    Surgical follow-up recommended    Lung bases are clear       MDM      24-year-old male presents with abdominal pain and vomiting.  Abdomen is tender to palpation diffusely.  Normal bowel sounds.  Nontoxic-appearing.  Afebrile.  Vitals within normal limits.    CBC, CMP, lipase, urinalysis, CT abdomen pelvis ordered for further evaluation.  Morphine 4 mg IV for pain.  1 L NS bolus.  Zofran 4 mg IV.    My independent interpretation of CT of the abdomen pelvis is that there is no free air.    Radiology reports \"findings consistent with acute appendicitis.\"    Discussed with patient.  Surgery notified.        Admission disposition: 11/13/2024  6:22 AM           Medical Decision Making      Disposition and Plan     Clinical Impression:  1. Acute appendicitis, unspecified acute appendicitis type    2. Leukocytosis, unspecified type         Disposition:  Admit  11/13/2024  6:22 am    Follow-up:  No follow-up provider specified.          Medications Prescribed:  Current Discharge Medication List        START taking these medications    Details   ondansetron 4 MG Oral Tablet Dispersible Take 1  tablet (4 mg total) by mouth every 4 (four) hours as needed for Nausea.  Qty: 10 tablet, Refills: 0      dicyclomine 20 MG Oral Tab Take 1 tablet (20 mg total) by mouth 4 (four) times daily as needed (abdominal pain).  Qty: 30 tablet, Refills: 0                 Supplementary Documentation:         Hospital Problems       Present on Admission  Date Reviewed: 10/23/2020            ICD-10-CM Noted POA    Acute appendicitis, unspecified acute appendicitis type K35.80 11/13/2024 Unknown

## 2024-11-13 NOTE — ED QUICK NOTES
Orders for admission, patient is aware of plan and ready to go upstairs. Any questions, please call ED RN kel at extension [phone].     Patient Covid vaccination status: Unvaccinated     COVID Test Ordered in ED: None    COVID Suspicion at Admission: N/A    Running Infusions:  None    Mental Status/LOC at time of transport: a&ox4.    Other pertinent information: pt's father is driving him over to [place]. Pt instructed to remain NPO  CIWA score: N/A   NIH score:  N/A

## (undated) DEVICE — TROCAR: Brand: KII SHIELDED BLADED ACCESS SYSTEM

## (undated) DEVICE — NEPTUNE E-SEP SMOKE EVACUATION PENCIL, COATED, 70MM BLADE, PUSH BUTTON SWITCH: Brand: NEPTUNE E-SEP

## (undated) DEVICE — APPLICATOR STD 6IN COT TIP WOOD HNDL ST

## (undated) DEVICE — ENDOPATH ULTRA VERESS INSUFFLATION NEEDLES WITH LUER LOCK CONNECTORS: Brand: ENDOPATH

## (undated) DEVICE — UNDYED BRAIDED (POLYGLACTIN 910), SYNTHETIC ABSORBABLE SUTURE: Brand: COATED VICRYL

## (undated) DEVICE — 40580 - THE PINK PAD - ADVANCED TRENDELENBURG POSITIONING KIT: Brand: 40580 - THE PINK PAD - ADVANCED TRENDELENBURG POSITIONING KIT

## (undated) DEVICE — BANDAGE ADH 1INX3IN NAT FAB N ADH PD CURAD

## (undated) DEVICE — POWER SHELL: Brand: SIGNIA

## (undated) DEVICE — ZZ--DISC-SUB-421016-SUT VCRL 0 L27IN ABSRB VLT L26MM UR-6 5/8-

## (undated) DEVICE — SLEEVE COMPR MD KNEE LEN SGL USE KENDALL SCD

## (undated) DEVICE — POUCH SPECIMEN WIRE 6X3 250ML

## (undated) DEVICE — SUT COAT VCRL+ 0 27IN UR-6 ABSRB VLT ANTIBACT

## (undated) DEVICE — ARTICULATION RELOAD WITH TRI-STAPLE TECHNOLOGY: Brand: ENDO GIA

## (undated) DEVICE — DALE ABDOMINAL BINDER, 12" WIDE, STRETCHES TO FIT 30"-45", 1 PER BOX.: Brand: DALE ABDOMINAL BINDER

## (undated) DEVICE — HUNTER GASPER TIP, DISPOSABLE: Brand: RENEW

## (undated) DEVICE — Device: Brand: SUTURE PASSOR PRO

## (undated) DEVICE — GLOVE SUR 6.5 SENSICARE PI PIP CRM PWD F

## (undated) DEVICE — SOLUTION IRRIG 1000ML 0.9% NACL USP BTL

## (undated) DEVICE — LAP CHOLE/APPY CDS-LF: Brand: MEDLINE INDUSTRIES, INC.

## (undated) DEVICE — TRADITIONAL MARYLAND DISSECTOR TIP, DISPOSABLE: Brand: RENEW

## (undated) NOTE — LETTER
[address]  [address]  Authorization for Surgical Operation and Procedure     Date:___________                                                                                                         Time:__________  I hereby authorize Surgeon(s):  Zainab Pelletier MD, my physician and his/her assistants (if applicable), which may include medical students, residents, and/or fellows, to perform the following surgical operation/ procedure and administer such anesthesia as may be determined necessary by my physician:  Operation/Procedure name (s) Procedure(s):  LAPAROSCOPIC APPENDECTOMY on Everton Lewis   2.   I recognize that during the surgical operation/procedure, unforeseen conditions may necessitate additional or different procedures than those listed above.  I, therefore, further authorize and request that the above-named surgeon, assistants, or designees perform such procedures as are, in their judgment, necessary and desirable.    3.   My surgeon/physician has discussed prior to my surgery the potential benefits, risks and side effects of this procedure; the likelihood of achieving goals; and potential problems that might occur during recuperation.  They also discussed reasonable alternatives to the procedure, including risks, benefits, and side effects related to the alternatives and risks related to not receiving this procedure.  I have had all my questions answered and I acknowledge that no guarantee has been made as to the result that may be obtained.    4.   Should the need arise during my operation/procedure, which includes change of level of care prior to discharge, I also consent to the administration of blood and/or blood products.  Further, I understand that despite careful testing and screening of blood or blood products by collecting agencies, I may still be subject to ill effects as a result of receiving a blood transfusion and/or blood products.  The following  are some, but not all, of the potential risks that can occur: fever and allergic reactions, hemolytic reactions, transmission of diseases such as Hepatitis, AIDS and Cytomegalovirus (CMV) and fluid overload.  In the event that I wish to have an autologous transfusion of my own blood, or a directed donor transfusion, I will discuss this with my physician.  Check only if Refusing Blood or Blood Products  I understand refusal of blood or blood products as deemed necessary by my physician may have serious consequences to my condition to include possible death. I hereby assume responsibility for my refusal and release the hospital, its personnel, and my physicians from any responsibility for the consequences of my refusal.          o  Refuse      5.   I authorize the use of any specimen, organs, tissues, body parts or foreign objects that may be removed from my body during the operation/procedure for diagnosis, research or teaching purposes and their subsequent disposal by hospital authorities.  I also authorize the release of specimen test results and/or written reports to my treating physician on the hospital medical staff or other referring or consulting physicians involved in my care, at the discretion of the Pathologist or my treating physician.    6.   I consent to the photographing or videotaping of the operations or procedures to be performed, including appropriate portions of my body for medical, scientific, or educational purposes, provided my identity is not revealed by the pictures or by descriptive texts accompanying them.  If the procedure has been photographed/videotaped, the surgeon will obtain the original picture, image, videotape or CD.  The hospital will not be responsible for storage, release or maintenance of the picture, image, tape or CD.    7.   I consent to the presence of a  or observers in the operating room as deemed necessary by my physician or their designees.    8.   I  recognize that in the event my procedure results in extended X-Ray/fluoroscopy time, I may develop a skin reaction.    9. If I have a Do Not Attempt Resuscitation (DNAR) order in place, that status will be suspended while in the operating room, procedural suite, and during the recovery period unless otherwise explicitly stated by me (or a person authorized to consent on my behalf). The surgeon or my attending physician will determine when the applicable recovery period ends for purposes of reinstating the DNAR order.  10. Patients having a sterilization procedure: I understand that if the procedure is successful the results will be permanent and it will therefore be impossible for me to inseminate, conceive, or bear children.  I also understand that the procedure is intended to result in sterility, although the result has not been guaranteed.   11. I acknowledge that my physician has explained sedation/analgesia administration to me including the risk and benefits I consent to the administration of sedation/analgesia as may be necessary or desirable in the judgment of my physician.    I CERTIFY THAT I HAVE READ AND FULLY UNDERSTAND THE ABOVE CONSENT TO OPERATION and/or OTHER PROCEDURE.    _________________________________________  __________________________________  Signature of Patient     Signature of Responsible Person         ___________________________________         Printed Name of Responsible Person           _________________________________                 Relationship to Patient  _________________________________________  ______________________________  Signature of Witness          Date  Time      Patient Name: Everton Lewis     : 2000                 Printed: 2024     Medical Record #: [id number]                     Page 1 of [address] [place] [address] [place]  [address]    Consent for Anesthesia    I, vEerton Lewis agree to be cared for  by an anesthesiologist, who is specially trained to monitor me and give me medicine to put me to sleep or keep me comfortable during my procedure    I understand that my anesthesiologist is not an employee or agent of [facility] or [employer]. He or she works for [employer].    As the patient asking for anesthesia services, I agree to:  Allow the anesthesiologist (anesthesia doctor) to give me medicine and do additional procedures as necessary. Some examples are: Starting or using an “IV” to give me medicine, fluids or blood during my procedure, and having a breathing tube placed to help me breathe when I’m asleep (intubation). In the event that my heart stops working properly, I understand that my anesthesiologist will make every effort to sustain my life, unless otherwise directed by [facility] Do Not Resuscitate documents.  Tell my anesthesia doctor before my procedure:  If I am pregnant.  The last time that I ate or drank.  All of the medicines I take (including prescriptions, herbal supplements, and pills I can buy without a prescription (including street drugs/illegal medications). Failure to inform my anesthesiologist about these medicines may increase my risk of anesthetic complications.  If I am allergic to anything or have had a reaction to anesthesia before.  I understand how the anesthesia medicine will help me (benefits).  I understand that with any type of anesthesia medicine there are risks:  The most common risks are: nausea, vomiting, sore throat, muscle soreness, damage to my eyes, mouth, or teeth (from breathing tube placement).  Rare risks include: remembering what happened during my procedure, allergic reactions to medications, injury to my airway, heart, lungs, vision, nerves, or muscles and in extremely rare instances death.  My doctor has explained to me other choices available to me for my care (alternatives).  Pregnant Patients (“epidural”):  I  understand that the risks of having an epidural (medicine given into my back to help control pain during labor), include itching, low blood pressure, difficulty urinating, headache or slowing of the baby’s heart. Very rare risks include infection, bleeding, seizure, irregular heart rhythms and nerve injury.  Regional Anesthesia (“spinal”, “epidural”, & “nerve blocks”):  I understand that rare but potential complications include headache, bleeding, infection, seizure, irregular heart rhythms, and nerve injury.    I can change my mind about having anesthesia services at any time before I get the medicine.    _____________________________________________________________________________  Patient (or Representative) Signature/Relationship to Patient  Date   Time    _____________________________________________________________________________   Name (if used)    Language/Organization   Time    _____________________________________________________________________________  Anesthesiologist Signature     Date   Time  I have discussed the procedure and information above with the patient (or patient’s representative) and answered their questions. The patient or their representative has agreed to have anesthesia services.    _____________________________________________________________________________  Witness        Date   Time  I have verified that the signature is that of the patient or patient’s representative, and that it was signed before the procedure  Patient Name: Everton Lewis     : 2000                 Printed: 2024     Medical Record #: [id number]                     Page 2 of 2

## (undated) NOTE — ED AVS SNAPSHOT
[facility] in [address] [place] [address] Drive,[address] Floor    [address] [place] [address]    Phone:  [phone]    Fax:  [phone]           Luisana Popee   MRN: [id number]    Department:  [facility] [place]   Date of Visit:  3/21/2017 [facility]   [phone]       To Check ER Wait Times:  TEXT 'ERwait' to 43744      Click [email or web address]      Or call [phone]    If you have any problems with your follow-up, please call our  at [phone].     Gene foote I have read and understand the instructions given to me by my caregivers. 24-Hour Pharmacies        Pharmacy Address Phone Number   [facility] [address] ([address]) Meena Cheatham (CPT=[id number])        Final result    Impression:    CONCLUSION:  Oblique fracture through the distal left radial metaphysis with approximately 4 mm of volar displacement distal fracture. Fracture line extends to the physis. No dislocation.         Dictate visit, view other health information and more. To sign up or find more information on getting   Proxy Access to your child’s MyChart go to [email or web address] and click on the   Sign Up Forms link in the Additional Information box on the right.

## (undated) NOTE — LETTER
[facility] [place]  [address] [address]  Dept: [phone]  Dept Fax: [phone]      March 21, 2017    Patient: Debbie Lara   Date of Visit: 3/21/2017       To Whom It May Concern:    Debbie Lara was seen and treated